# Patient Record
Sex: MALE | Race: ASIAN | NOT HISPANIC OR LATINO | URBAN - METROPOLITAN AREA
[De-identification: names, ages, dates, MRNs, and addresses within clinical notes are randomized per-mention and may not be internally consistent; named-entity substitution may affect disease eponyms.]

---

## 2019-06-02 ENCOUNTER — EMERGENCY (EMERGENCY)
Facility: HOSPITAL | Age: 24
LOS: 1 days | Discharge: HOME | End: 2019-06-02
Attending: EMERGENCY MEDICINE | Admitting: EMERGENCY MEDICINE
Payer: COMMERCIAL

## 2019-06-02 VITALS
SYSTOLIC BLOOD PRESSURE: 127 MMHG | OXYGEN SATURATION: 97 % | RESPIRATION RATE: 16 BRPM | HEART RATE: 97 BPM | DIASTOLIC BLOOD PRESSURE: 71 MMHG | TEMPERATURE: 98 F

## 2019-06-02 PROCEDURE — 12013 RPR F/E/E/N/L/M 2.6-5.0 CM: CPT | Mod: LT

## 2019-06-02 PROCEDURE — 99282 EMERGENCY DEPT VISIT SF MDM: CPT | Mod: 25

## 2019-06-02 NOTE — ED ADULT NURSE NOTE - OBJECTIVE STATEMENT
break coverage RN- pt s/p mechanical fall hitting left side head right above eye brown and left knee- pt admit to ETOH use tonight, denies any LOC, able to recall all events, denies any anticoagulation usage, or PHM. pt currently awake, a/ox3, vitally stable in NAD, family at bedside who witnessed fall. pending EDMD evaluation, will continue to monitor

## 2019-06-02 NOTE — ED PROVIDER NOTE - ATTENDING CONTRIBUTION TO CARE
24 y/o healthy M with laceration to left forehead.  Pt reports that he was walking outside, tripped on the sidewalk and struck his head on the ground.  No LOC.  Pt denies other injuries and was able to get up on his own.  Friend at bedside witnessed fall.  Pt does endorse having a couple alcoholic drinks earlier in the evening.  No drugs.  No vision changes, ha, vomiting.  UTD with tetanus.  Well appearing, lying comfortably in stretcher, awake and alert, nontoxic.  VSS.  NCAT EOMI PERRL no proptosis or pain with EOM.  (+)2-3cm linear laceration to left lateral eyebrow.  No orbital rim tenderness or step off.  Neck supple, no midline tenderness.  All extremities intact, FROM, no gross deformities.  Gait steady.  Plan for lac repair, [place] home with wound care precautions.

## 2019-06-02 NOTE — ED PROCEDURE NOTE - ATTENDING CONTRIBUTION TO CARE
Rayshawn CASTILLO: I have personally seen and examined this patient. I have fully participated in the care of this patient. I was present at all key portions of the procedure.

## 2019-06-02 NOTE — ED PROVIDER NOTE - NS ED ROS FT
CONSTITUTIONAL: No fevers, no chills, no lightheadedness, no dizziness  Eyes: no visual changes  Ears: no ear drainage, no ear pain  Nose: no nasal congestion  Mouth/Throat: no sore throat  CV: No chest pain, no palpitations  PULM: No SOB, no cough  GI: No n/v/d, no abd pain  : no dysuria, no hematuria  SKIN: see hpi  NEURO: no headache, no focal weakness or numbness  PSYCHIATRIC: no known mental health issues.

## 2019-06-02 NOTE — ED ADULT TRIAGE NOTE - CHIEF COMPLAINT QUOTE
Pt presents with c/o laceration to left forehead. States he was walking when he tripped and hit his head on the edge of the sidewalk. Denies loc, anticoagulant use, neck/back pain, numbness/tingling, alterations to vision or unsteady gait.

## 2019-06-02 NOTE — ED PROVIDER NOTE - PHYSICAL EXAMINATION
Gen: Well appearing, NAD  Head: NCAT  HEENT: PERRL, MMM, normal conjunctiva, anicteric, neck supple  Lung: CTAB, no adventitious sounds  CV: RRR, no murmurs  Abd: soft, NTND, no rebound or guarding, no CVAT  MSK: No edema, no visible deformities  Neuro: Moving all extremities grossly. Ambulatory with steady gait.  Skin: 3cm laceration linear through L forehead. Nonbleeding  Psych: normal mood and affect

## 2019-06-02 NOTE — ED PROVIDER NOTE - CLINICAL SUMMARY MEDICAL DECISION MAKING FREE TEXT BOX
Despite EtOH use, appears well and no complaints. Alert and oriented and ambulatory with no issue of balance. CTH not indicated at this time. Lac repair and reassess

## 2019-06-02 NOTE — ED PROVIDER NOTE - OBJECTIVE STATEMENT
22yo M no pmx admits to EtOH use today s/p mechanical fall, states that he tripped on sidewalk and hit his face and started bleeding from a lac. Otherwise no other injuries reported. Denies other drug use. Conversive, alert, and interactive upon evaluation. States no pain at injury or headache/blurry vision/n/v.

## 2022-02-09 NOTE — ED PROVIDER NOTE - NSFOLLOWUPINSTRUCTIONS_ED_ALL_ED_FT
Sling - Follow up with your doctor in 1 week - bring copies of your results if you were given. If you do not have a primary doctor, please call [phone] to find one convenient for you    - Return to the ED for any new, worsening, or concerning symptoms to you    - Continue to take ibuprofen/tylenol as directed as needed for pain    - Keep the area clean and dry. PLEASE follow up either with your primary doctor or the ED in 5-7 days for suture removal    - Rest and keep yourself hydrated with fluids Minoxidil Counseling: Minoxidil is a topical medication which can increase blood flow where it is applied. It is uncertain how this medication increases hair growth. Side effects are uncommon and include stinging and allergic reactions.
